# Patient Record
Sex: MALE | Race: WHITE | NOT HISPANIC OR LATINO | Employment: FULL TIME | URBAN - METROPOLITAN AREA
[De-identification: names, ages, dates, MRNs, and addresses within clinical notes are randomized per-mention and may not be internally consistent; named-entity substitution may affect disease eponyms.]

---

## 2020-05-26 ENCOUNTER — OFFICE VISIT (OUTPATIENT)
Dept: FAMILY MEDICINE | Facility: CLINIC | Age: 30
End: 2020-05-26

## 2020-05-26 VITALS
SYSTOLIC BLOOD PRESSURE: 124 MMHG | WEIGHT: 258 LBS | OXYGEN SATURATION: 97 % | HEART RATE: 91 BPM | TEMPERATURE: 97.8 F | RESPIRATION RATE: 16 BRPM | DIASTOLIC BLOOD PRESSURE: 86 MMHG

## 2020-05-26 DIAGNOSIS — R10.11 RUQ ABDOMINAL PAIN: Primary | ICD-10-CM

## 2020-05-26 DIAGNOSIS — Z87.898 HISTORY OF ALCOHOL USE: ICD-10-CM

## 2020-05-26 DIAGNOSIS — Z87.891 HISTORY OF TOBACCO USE: ICD-10-CM

## 2020-05-26 PROBLEM — F90.2 ATTENTION DEFICIT HYPERACTIVITY DISORDER (ADHD), COMBINED TYPE: Status: ACTIVE | Noted: 2020-05-26

## 2020-05-26 LAB
% GRANULOCYTES: 61 % (ref 42.2–75.2)
HCT VFR BLD AUTO: 44.1 % (ref 39–51)
HEMOGLOBIN: 15.2 G/DL (ref 13.4–17.5)
LYMPHOCYTES NFR BLD AUTO: 30.8 % (ref 20.5–51.1)
MCH RBC QN AUTO: 30.1 PG (ref 27–31)
MCHC RBC AUTO-ENTMCNC: 34.5 G/DL (ref 33–37)
MCV RBC AUTO: 87.4 FL (ref 80–100)
MONOCYTES NFR BLD AUTO: 8.2 % (ref 1.7–9.3)
PLATELET # BLD AUTO: 263 K/UL (ref 140–450)
RBC # BLD AUTO: 5.05 X10/CMM (ref 4.2–5.9)
WBC # BLD AUTO: 7.1 X10/CMM (ref 3.8–11)

## 2020-05-26 PROCEDURE — 99203 OFFICE O/P NEW LOW 30 MIN: CPT | Performed by: NURSE PRACTITIONER

## 2020-05-26 PROCEDURE — 85025 COMPLETE CBC W/AUTO DIFF WBC: CPT | Performed by: NURSE PRACTITIONER

## 2020-05-26 PROCEDURE — 93000 ELECTROCARDIOGRAM COMPLETE: CPT | Performed by: NURSE PRACTITIONER

## 2020-05-26 PROCEDURE — 36415 COLL VENOUS BLD VENIPUNCTURE: CPT | Performed by: NURSE PRACTITIONER

## 2020-05-26 SDOH — HEALTH STABILITY: PHYSICAL HEALTH: ON AVERAGE, HOW MANY MINUTES DO YOU ENGAGE IN EXERCISE AT THIS LEVEL?: 30 MIN

## 2020-05-26 SDOH — HEALTH STABILITY: PHYSICAL HEALTH: ON AVERAGE, HOW MANY DAYS PER WEEK DO YOU ENGAGE IN MODERATE TO STRENUOUS EXERCISE (LIKE A BRISK WALK)?: 2 DAYS

## 2020-05-26 NOTE — PATIENT INSTRUCTIONS
Labs and RUQ ultrasound  Great job on abstaining from alcohol and tobacco!  Focus on decreasing simple sugars and saturated fats- focus on protein, fiber, lots of colors in the diet, and monounsaturated fats (like avocados, olive oil, nuts and seeds)  Elvi will let you know results  Routine physical with fasting labs in 3-6 months

## 2020-05-26 NOTE — PROGRESS NOTES
"Problem(s) Oriented visit        SUBJECTIVE:                                                    Devante Reed is a 30 year old male who presents to clinic today for the following health issues :    Devante comes to the clinic generally feeling well, but the last week has been more bothered by RUQ pressure. He feels it started one year ago when reaching down to grab some luggage. Discomfort \"faded out\" and has been mild and intermittent since then, but has noticed more frequently in the past week. Discomfort is between 1-4/10, worse with lying down or leaning forward. Unable to identify alleviating factors. Impacting his sleep, has to lay on his left side, though pressure on the area does not bother him. Describes the pain as a \"pressure type\" discomfort, non-radiating. Has not had anything like this in the past. No fevers, chills, unintended weight loss, drenching night sweats, nausea/vomiting, diffuse abdominal pain, problems with bowels/bladder, itching, or jaundice. BMs are normal brown color, but did have raymundo colored stools with the initial RUQ pain last year. No ripping or tearing sensation, no pulsatile abdominal mass. No diaphoresis, SOB/ALMONTE, radiation to jaw or arm, weakness, fatigue with the RUQ pain. No family history of GI cancers or early ASCVD. Of note, he quit smoking three months ago. He quit drinking alcohol one month ago- had been drinking about 1L hard liquor daily for eight years- referred to this pattern as \"high functioning alcoholism.\" Had mild withdrawal symptoms at the time but is now feeling well without the desire to start drinking again. He is working on a healthy diet and has been walking twice per week as well. He states turning 30 motivated him to make changes for a healthier lifestyle.    PMH significant for ADHD for which he sparingly takes medication, hx tobacco use (quit three months ago), alcohol use (quit one month ago. PSH significant for tympanostomy tubes as a " child.      Problem list, Medication list, Allergies, and Medical/Social/Surgical histories reviewed in [facility] and updated as appropriate.   Additional history: as documented    ROS:  General, CV, resp, GI, , MSK, neuro, psych negative except as listed per HPI    Histories:   Patient Active Problem List   Diagnosis     Health Care Home     Attention deficit hyperactivity disorder (ADHD), combined type     History of tobacco use     History of alcohol use     Past Surgical History:   Procedure Laterality Date     NO HISTORY OF SURGERY       TYMPANOSTOMY, LOCAL/TOPICAL ANESTHESIA      when young       Social History     Tobacco Use     Smoking status: Former Smoker     Last attempt to quit: 10/14/2011     Years since quittin.6     Smokeless tobacco: Never Used   Substance Use Topics     Alcohol use: Yes     Family History   Problem Relation Age of Onset     Lung Cancer Paternal Grandfather         after age 50     Coronary Artery Disease Early Onset No family hx of          No current outpatient medications on file.       OBJECTIVE:                                                    Physical Exam:    /86   Pulse 91   Temp 97.8  F (36.6  C)   Resp 16   Wt 117 kg (258 lb)   SpO2 97%     CONSTITUTIONAL: Alert non-toxic appearing male in no acute distress  HEAD: Normocephalic, atraumatic  EYES: PERRLA; no scleral icterus; sclera without injection  ENT:  oropharynx pink without lesions; posterior pharynx without exudates  NECK: Neck supple without lymphadenopathy  RESPIRATORY: Lungs clear to auscultation, respirations unlabored  CV: Regular rate and rhythm, S1S2, no clicks, murmurs, rubs, or gallops; bilateral lower extremities without edema, dorsalis pedis pulses 2+ bilaterally  GASTROINTESTINAL: Normoactive bowel sounds x4 quadrants, abdomen soft, non-distended, and non-tender to palpation without masses, no splenomegaly, possible hepatomegaly on exam, negative Nair's sign, negative Robert's sign, no  rebound tenderness, guarding, or rigidity  LYMPHATIC: Cervical, supraclavicular, axillary, and inguinal nodes without lymphadenopathy  MUSCULOSKELETAL: Moves all extremities, no obvious muscle wasting  NEUROLOGIC: No gross deficits, normal gait  SKIN: Appropriate color for race, warm and dry, no rashes or lesions to unclothed skin  PSYCHIATRIC: Pleasant and interactive, affect bright, makes appropriate eye contact, thought process logical       ASSESSMENT/PLAN:                                                        Devante was seen today for other.    Diagnoses and all orders for this visit:    RUQ abdominal pain  -     EKG 12-lead complete w/read - Clinics  -     Comp. Metabolic Panel (14) ([facility])  -     CBC with Diff/Plt ([facility])  -     Referral to [facility]  -     Amylase  Serum ([facility])  -     Lipase  Serum ([facility])    EKG WNL, no s/sxs ACS. Possible hepatomegaly on exam- obtain labs and RUQ US. Discussed differential dxs of NAFLD, cholecystitis, cirrhosis, pancreatitis. Not acutely ill at the moment, reviewed s/sxs infection/emergency and when to seek emergency care. Await results of lab and US- in the meantime, continue his healthy lifestyle changes, continue to abstain from alcohol, take in healthy foods, and exercise. Return to clinic in 3-6 mos with fasting labs and physical.    History of tobacco use    Commended on cessation, reviewed QuitPlan if needed    History of alcohol use    Commended on cessation, reviewed resources for support    Patient needs assistance with ADLs: none identified today  Patient needs assistance with iADLs: none identified today    The following health maintenance items are reviewed in Epic and correct as of today:  Health Maintenance   Topic Date Due     PREVENTIVE CARE VISIT  1990     HIV SCREENING  01/06/2005     DTAP/TDAP/TD IMMUNIZATION (1 - Tdap) 01/06/2015     PHQ-2  01/01/2020     INFLUENZA VACCINE (Season Ended) 09/01/2020     IPV IMMUNIZATION  Aged Out      MENINGITIS IMMUNIZATION  Aged Out       Patient Instructions   Labs and RUQ ultrasound  Great job on abstaining from alcohol and tobacco!  Focus on decreasing simple sugars and saturated fats- focus on protein, fiber, lots of colors in the diet, and monounsaturated fats (like avocados, olive oil, nuts and seeds)  Elvi will let you know results  Routine physical with fasting labs in 3-6 months      VALENCIA Pollard CNP  [facility]  Family Practice  [facility]  [phone]    For any issues my office # is [phone]

## 2020-05-27 LAB
ALBUMIN SERPL-MCNC: 4.7 G/DL (ref 4.1–5.2)
ALBUMIN/GLOB SERPL: 2.2 {RATIO} (ref 1.2–2.2)
ALP SERPL-CCNC: 48 IU/L (ref 39–117)
ALT SERPL-CCNC: 32 IU/L (ref 0–44)
AMYLASE SERPL-CCNC: 50 U/L (ref 31–110)
AST SERPL-CCNC: 24 IU/L (ref 0–40)
BILIRUB SERPL-MCNC: 0.3 MG/DL (ref 0–1.2)
BUN SERPL-MCNC: 17 MG/DL (ref 6–20)
BUN/CREATININE RATIO: 18 (ref 9–20)
CALCIUM SERPL-MCNC: 9.3 MG/DL (ref 8.7–10.2)
CHLORIDE SERPLBLD-SCNC: 107 MMOL/L (ref 96–106)
CREAT SERPL-MCNC: 0.93 MG/DL (ref 0.76–1.27)
EGFR IF AFRICN AM: 127 ML/MIN/1.73
EGFR IF NONAFRICN AM: 110 ML/MIN/1.73
GLOBULIN, TOTAL: 2.1 G/DL (ref 1.5–4.5)
GLUCOSE SERPL-MCNC: ABNORMAL MG/DL (ref 70–99)
LIPASE SERPL-CCNC: 24 U/L (ref 13–78)
POTASSIUM SERPL-SCNC: ABNORMAL MMOL/L
PROT SERPL-MCNC: 6.8 G/DL (ref 6–8.5)
SODIUM SERPL-SCNC: 143 MMOL/L (ref 134–144)
TOTAL CO2: 22 MMOL/L (ref 20–29)

## 2020-05-28 ENCOUNTER — TRANSFERRED RECORDS (OUTPATIENT)
Dept: FAMILY MEDICINE | Facility: CLINIC | Age: 30
End: 2020-05-28

## 2020-06-01 ENCOUNTER — TELEPHONE (OUTPATIENT)
Dept: FAMILY MEDICINE | Facility: CLINIC | Age: 30
End: 2020-06-01

## 2020-06-01 NOTE — TELEPHONE ENCOUNTER
Called patient with abdominal US result.  Informed him of fatty liver disease.  Patient will be working on stopping alcohol, and losing weight.  Also will decrease his intake of simple sugars and saturated fats..  If discomfort gets worse patient was advised to call. Patient also advised to schedule a physical in 3 months with fasting labs.

## 2021-01-15 ENCOUNTER — HEALTH MAINTENANCE LETTER (OUTPATIENT)
Age: 31
End: 2021-01-15

## 2021-10-24 ENCOUNTER — HEALTH MAINTENANCE LETTER (OUTPATIENT)
Age: 31
End: 2021-10-24

## 2022-02-13 ENCOUNTER — HEALTH MAINTENANCE LETTER (OUTPATIENT)
Age: 32
End: 2022-02-13

## 2022-02-14 ENCOUNTER — OFFICE VISIT (OUTPATIENT)
Dept: FAMILY MEDICINE | Facility: CLINIC | Age: 32
End: 2022-02-14

## 2022-02-14 VITALS
HEIGHT: 71 IN | OXYGEN SATURATION: 96 % | HEART RATE: 100 BPM | BODY MASS INDEX: 36.82 KG/M2 | DIASTOLIC BLOOD PRESSURE: 86 MMHG | SYSTOLIC BLOOD PRESSURE: 130 MMHG | WEIGHT: 263 LBS

## 2022-02-14 DIAGNOSIS — R39.9 LOWER URINARY TRACT SYMPTOMS (LUTS): ICD-10-CM

## 2022-02-14 DIAGNOSIS — R31.0 GROSS HEMATURIA: Primary | ICD-10-CM

## 2022-02-14 LAB
BACTERIA URINE: 0
BILIRUB UR QL STRIP: ABNORMAL
BLOOD URINE DIP: ABNORMAL
CASTS/LPF: 0
COLOR UR: YELLOW
CRYSTAL URINE: 0
EPITHELIAL CELLS - QUEST: ABNORMAL
GLUCOSE UR STRIP-MCNC: ABNORMAL MG/DL
KETONES UR QL STRIP: ABNORMAL
LEUKOCYTE ESTERASE URINE DIP: ABNORMAL
MUCOUS URINE: 0
NITRITE UR QL STRIP: ABNORMAL
PH UR STRIP: 6 PH (ref 5–9)
PROT UR QL: ABNORMAL MG/DL (ref ?–0.01)
RBC URINE: ABNORMAL (ref 0–3)
SP GR UR STRIP: 1.01 (ref 1–1.02)
UROBILINOGEN UR QL STRIP: 0.2 EU/DL (ref 0.2–1)
WBC URINE: 0 (ref 0–3)

## 2022-02-14 PROCEDURE — 99214 OFFICE O/P EST MOD 30 MIN: CPT | Performed by: FAMILY MEDICINE

## 2022-02-14 PROCEDURE — 81003 URINALYSIS AUTO W/O SCOPE: CPT | Performed by: FAMILY MEDICINE

## 2022-02-14 ASSESSMENT — MIFFLIN-ST. JEOR: SCORE: 2165.09

## 2022-02-14 NOTE — PROGRESS NOTES
"  Barry Low is a 32 year old patient who presents to clinic for evaluation.  He was in his usual state of health until yesterday when he was urinating and developed very sharp pain and saw bright red blood in his urine.  Later in the day had less discomfort with urination but still a small amount of blood.  Today, some minimal residual symptoms and no blood.  No fever or chills.  No discharge.  Same partner for 3 years and no intercourse in 2 weeks.  No concern about STI.  Former smoker but quit over 10 years ago.  No family history of urologic malignancy.  No other concerns.          Review of Systems   Constitutional, HEENT, cardiovascular, pulmonary, GI, , musculoskeletal, neuro, skin, endocrine and psych systems are negative, except as otherwise noted.      Objective    /86   Pulse 100   Ht 1.803 m (5' 11\")   Wt 119.3 kg (263 lb)   SpO2 96%   BMI 36.68 kg/m      General: Well appearing, NAD  Psych: normal mood and affect        Results for orders placed or performed in visit on 02/14/22 (from the past 24 hour(s))   Urinalysis ([facility])   Result Value Ref Range    Color Urine Yellow     pH Urine 6.0 5 - 9 pH    Specific Gravity Urine 1.010 1.005 - 1.025    Protein Urine neg 0.01 mg/dL    Glucose Urine neg     Ketones Urine neg     Leukocyte Esterase Urine neg     Blood Urine trace (A)     Nitrite Urine Neg NEG    Bilirubin Urine Dip neg     Urobilinogen Urine 0.2 0.2 - 1.0 EU/dL    WBC Urine 0 0 - 3    RBC Urine rare 0 - 3    Epithelial Cells rare     Crystal Urine 0     Bacteria Urine 0     Mucous Urine 0     Casts/LPF 0        Gross hematuria  Improving.  Discussed CT and cysto vs observation.  Given age and improvement inclined to monitor.  Possible stone.  He prefers to avoid further testing if possible.  Recommend repeat UA in 1 month and follow up sooner if symptoms fail to completely resolve or worsen.    Lower urinary tract symptoms (LUTS)  - Urinalysis ([facility])      Follow up in 1 month " for UA

## 2022-05-12 ENCOUNTER — OFFICE VISIT (OUTPATIENT)
Dept: FAMILY MEDICINE | Facility: CLINIC | Age: 32
End: 2022-05-12

## 2022-05-12 VITALS
RESPIRATION RATE: 18 BRPM | HEART RATE: 80 BPM | WEIGHT: 261.4 LBS | OXYGEN SATURATION: 97 % | TEMPERATURE: 97.9 F | HEIGHT: 71 IN | DIASTOLIC BLOOD PRESSURE: 80 MMHG | SYSTOLIC BLOOD PRESSURE: 128 MMHG | BODY MASS INDEX: 36.6 KG/M2

## 2022-05-12 DIAGNOSIS — Z87.891 HISTORY OF TOBACCO USE: ICD-10-CM

## 2022-05-12 DIAGNOSIS — Z23 NEED FOR DIPHTHERIA-TETANUS-PERTUSSIS (TDAP) VACCINE: ICD-10-CM

## 2022-05-12 DIAGNOSIS — R31.9 HEMATURIA, UNSPECIFIED TYPE: ICD-10-CM

## 2022-05-12 DIAGNOSIS — K60.2 ANAL FISSURE: Primary | ICD-10-CM

## 2022-05-12 DIAGNOSIS — Z87.898 HISTORY OF ALCOHOL USE DISORDER: ICD-10-CM

## 2022-05-12 LAB
BACTERIA URINE: NORMAL
BILIRUB UR QL STRIP: 0
BLOOD URINE DIP: 0
CASTS/LPF: NORMAL
COLOR UR: YELLOW
CRYSTAL URINE: NORMAL
EPITHELIAL CELLS - QUEST: NORMAL
GLUCOSE UR STRIP-MCNC: 0 MG/DL
KETONES UR QL STRIP: 0
LEUKOCYTE ESTERASE URINE DIP: 0
MUCOUS URINE: NORMAL
NITRITE UR QL STRIP: NORMAL
PH UR STRIP: 5.5 PH (ref 5–9)
PROT UR QL: 0 MG/DL (ref ?–0.01)
RBC URINE: NORMAL
SP GR UR STRIP: 1.02 (ref 1–1.02)
UROBILINOGEN UR QL STRIP: 0.2 EU/DL (ref 0.2–1)
WBC URINE: NORMAL

## 2022-05-12 PROCEDURE — 99213 OFFICE O/P EST LOW 20 MIN: CPT | Mod: 25 | Performed by: FAMILY MEDICINE

## 2022-05-12 PROCEDURE — 90715 TDAP VACCINE 7 YRS/> IM: CPT | Performed by: FAMILY MEDICINE

## 2022-05-12 PROCEDURE — 81003 URINALYSIS AUTO W/O SCOPE: CPT | Performed by: FAMILY MEDICINE

## 2022-05-12 PROCEDURE — 90471 IMMUNIZATION ADMIN: CPT | Mod: 59 | Performed by: FAMILY MEDICINE

## 2022-05-12 NOTE — PATIENT INSTRUCTIONS
- Aim for 1-2 soft (like toothpaste) bowel movements per day  - Drink plenty of water (aim for 1.5 L per day)  - Continue eating a high fiber diet  - Consider adding a daily over counter fiber supplement (such as Metamucil or Citrucel). Use one tablespoon in eight ounces of liquid by mouth twice a day. Adjust as needed to maintain 1-2 soft bowel movements per day.  - If you are not able to tolerate a fiber supplement use Miralax; 17 g (capful) in 8 oz water orally, twice daily. Adjust as needed to maintain 1-2 soft bowel movements per day.

## 2022-05-12 NOTE — PROGRESS NOTES
"SUBJECTIVE:    Devante Reed, is a 32 year old male with ADHD, alcohol use disorder,  for the below:     1. 3 day Hx of blood in stools. Bright red blood coating stool with streaks on toilet paper. Associated with moderate sharp pain on passage of stool. No recent travel. No FHx of colon cancer. Stools once per day on average. Has been harder stool of late (since stopping drinking all alcohol : stopped drinking around 21 days ago). Has also quit smoking.     2. Also due 1 month recheck of gross hematuria with associated sharp urethral pain. Thought possible passage of renal stone at that time. Trace blood in urine: repeat UA due today.      OBJECTIVE:  Vitals:    05/12/22 1328   BP: 128/80   Pulse: 80   Resp: 18   Temp: 97.9  F (36.6  C)   TempSrc: Temporal   SpO2: 97%   Weight: 118.6 kg (261 lb 6.4 oz)   Height: 1.803 m (5' 11\")    Body mass index is 36.46 kg/m .    General: no acute distress, cooperative with exam.  GI: no external hemorrhoids present. No anal skin tags.    UA RESULTS:  Lab Test 05/12/22  0000   COLOR Yellow   SG 1.020   URINEPH 5.5   UROBILINOGEN 0.2   NITRITE Neg   RBCU  --    WBCU  --          ASSESSMENT / PLAN:        Anal fissure  Discussed aetiology. Discussed aiming for soft stool.   Discussed and issued on AVS:  - Aim for 1-2 soft (like toothpaste) bowel movements per day  - Drink plenty of water (aim for 1.5 L per day)  - Continue eating a high fiber diet  - Consider adding a daily over counter fiber supplement (such as Metamucil or Citrucel). Use one tablespoon in eight ounces of liquid by mouth twice a day. Adjust as needed to maintain 1-2 soft bowel movements per day.  - If you are not able to tolerate a fiber supplement use Miralax; 17 g (capful) in 8 oz water orally, twice daily. Adjust as needed to maintain 1-2 soft bowel movements per day.      Need for diphtheria-tetanus-pertussis (Tdap) vaccine  -     TDAP (ADACEL,BOOSTRIX)  -     VACCINE ADMINISTRATION, INITIAL    Hematuria, " unspecified type  Microscopic hematuria has now cleared. No further episodes of juliet hematuria. In keeping with prior passage of renal stone.   -     Urinalysis ([facility])    History of alcohol use disorder  History of tobacco use  Reports quitting alcohol and tobacco. Commended on lifestyle changes and ongoing support offered.

## 2022-05-23 ENCOUNTER — OFFICE VISIT (OUTPATIENT)
Dept: FAMILY MEDICINE | Facility: CLINIC | Age: 32
End: 2022-05-23

## 2022-05-23 VITALS
BODY MASS INDEX: 36.54 KG/M2 | OXYGEN SATURATION: 97 % | WEIGHT: 261 LBS | HEIGHT: 71 IN | SYSTOLIC BLOOD PRESSURE: 136 MMHG | TEMPERATURE: 97.5 F | HEART RATE: 98 BPM | DIASTOLIC BLOOD PRESSURE: 93 MMHG | RESPIRATION RATE: 18 BRPM

## 2022-05-23 DIAGNOSIS — M54.9 MID BACK PAIN ON LEFT SIDE: Primary | ICD-10-CM

## 2022-05-23 PROCEDURE — 99213 OFFICE O/P EST LOW 20 MIN: CPT | Performed by: NURSE PRACTITIONER

## 2022-05-23 NOTE — PROGRESS NOTES
"Problem(s) Oriented visit        SUBJECTIVE:                                                    Devante Reed is a 32 year old male who presents to clinic today for the following health issues :    Left mid back pain x 3 weeks. No trauma/injury. Mild in the morning but progressively worsens throughout the day. Rates 6/10 at its worst. Does not radiate. No vigorous exercise or strength training. Has lost 14 lbs with diet. Got new mattress, new office chair to try and help with pain. Last 2 days has taken 200-400 mg Ibuprofen which helps some.     Problem list, Medication list, Allergies, and Medical/Social/Surgical histories reviewed in [facility] and updated as appropriate.   Additional history: as documented    ROS:  5 point ROS completed and negative except noted above, including Gen, CV, Resp, MS, Neuro    OBJECTIVE:                                                    BP (!) 136/93   Pulse 98   Temp 97.5  F (36.4  C) (Temporal)   Resp 18   Ht 1.803 m (5' 11\")   Wt 118.4 kg (261 lb)   SpO2 97%   BMI 36.40 kg/m    Body mass index is 36.4 kg/m .   GENERAL: healthy, alert and no distress  RESP: lungs clear to auscultation - no rales, rhonchi or wheezes  CV: regular rates and rhythm, normal S1 S2, no S3 or S4 and no murmur, click or rub  MS: left mid back below scapula with tenderness to palpation, normal ROM  SKIN: no suspicious lesions or rashes  NEURO: Normal strength and tone, sensory exam grossly normal and mentation intact     ASSESSMENT/PLAN:                                                      Devante was seen today for musculoskeletal problem.    Diagnoses and all orders for this visit:    Mid back pain on left side    Discussed typical mechanical back pain, typical causes, and atypical back pain, including red flag symptoms.  Discussed conservative treatments including stretching and strengthening, use of heat and/or ice, NSAIDs with food. If no improvement after 2 weeks, then consider referral to physical " therapy, chiropractor, massage.    See Patient Instructions  Patient Instructions   Massage area with tennis ball while laying on back on floor 1-2 times daily. Heat applied before & ice after    Ibuprofen 600 mg 2-3 times daily for 1-2 weeks to help with inflammation.           VALENCIA Arana CNP  [facility]  Family Practice  [facility]  [phone]    For any issues my office # is [phone]

## 2022-05-23 NOTE — PATIENT INSTRUCTIONS
Massage area with tennis ball while laying on back on floor 1-2 times daily. Heat applied before & ice after    Ibuprofen 600 mg 2-3 times daily for 1-2 weeks to help with inflammation.

## 2022-10-16 ENCOUNTER — HEALTH MAINTENANCE LETTER (OUTPATIENT)
Age: 32
End: 2022-10-16

## 2023-03-26 ENCOUNTER — HEALTH MAINTENANCE LETTER (OUTPATIENT)
Age: 33
End: 2023-03-26

## 2023-04-20 ENCOUNTER — TRANSFERRED RECORDS (OUTPATIENT)
Dept: FAMILY MEDICINE | Facility: CLINIC | Age: 33
End: 2023-04-20

## 2023-06-19 ENCOUNTER — TRANSFERRED RECORDS (OUTPATIENT)
Dept: FAMILY MEDICINE | Facility: CLINIC | Age: 33
End: 2023-06-19

## 2023-06-23 ENCOUNTER — HOSPITAL ENCOUNTER (OUTPATIENT)
Dept: CT IMAGING | Facility: CLINIC | Age: 33
Discharge: HOME | End: 2023-06-23
Attending: PHYSICIAN ASSISTANT | Admitting: PHYSICIAN ASSISTANT
Payer: COMMERCIAL

## 2023-06-23 DIAGNOSIS — K76.0 HEPATIC STEATOSIS: ICD-10-CM

## 2023-06-23 DIAGNOSIS — R10.11 RIGHT UPPER QUADRANT ABDOMINAL PAIN: ICD-10-CM

## 2023-06-23 PROCEDURE — 250N000011 HC RX IP 250 OP 636

## 2023-06-23 PROCEDURE — 74177 CT ABD & PELVIS W/CONTRAST: CPT

## 2023-06-23 PROCEDURE — 250N000009 HC RX 250

## 2023-06-23 RX ORDER — IOPAMIDOL 755 MG/ML
128 INJECTION, SOLUTION INTRAVASCULAR ONCE
Status: COMPLETED | OUTPATIENT
Start: 2023-06-23 | End: 2023-06-23

## 2023-06-23 RX ADMIN — SODIUM CHLORIDE 76 ML: 9 INJECTION, SOLUTION INTRAVENOUS at 07:06

## 2023-06-23 RX ADMIN — IOPAMIDOL 128 ML: 755 INJECTION, SOLUTION INTRAVENOUS at 07:06

## 2023-06-28 ENCOUNTER — TRANSFERRED RECORDS (OUTPATIENT)
Dept: FAMILY MEDICINE | Facility: CLINIC | Age: 33
End: 2023-06-28

## 2023-08-17 ENCOUNTER — TRANSFERRED RECORDS (OUTPATIENT)
Dept: FAMILY MEDICINE | Facility: CLINIC | Age: 33
End: 2023-08-17

## 2023-09-18 PROCEDURE — 99281 EMR DPT VST MAYX REQ PHY/QHP: CPT

## 2023-09-19 ENCOUNTER — HOSPITAL ENCOUNTER (EMERGENCY)
Facility: CLINIC | Age: 33
Discharge: LEFT BEFORE DISCHARGE | End: 2023-09-19
Admitting: PHYSICIAN ASSISTANT
Payer: COMMERCIAL

## 2023-09-19 VITALS
RESPIRATION RATE: 14 BRPM | OXYGEN SATURATION: 98 % | HEIGHT: 72 IN | BODY MASS INDEX: 32.37 KG/M2 | DIASTOLIC BLOOD PRESSURE: 88 MMHG | TEMPERATURE: 98 F | HEART RATE: 97 BPM | SYSTOLIC BLOOD PRESSURE: 132 MMHG | WEIGHT: 239 LBS

## 2023-09-19 ASSESSMENT — ACTIVITIES OF DAILY LIVING (ADL)
ADLS_ACUITY_SCORE: 35
ADLS_ACUITY_SCORE: 35

## 2023-09-19 NOTE — ED TRIAGE NOTES
Ate a hard pretzel at 2345 and felt like it cut his esophagus. Also noticed he spitted some blood.       Triage Assessment       Row Name 09/19/23 0014       Triage Assessment (Adult)    Airway WDL WDL       Respiratory WDL    Respiratory WDL WDL       Skin Circulation/Temperature WDL    Skin Circulation/Temperature WDL WDL       Cardiac WDL    Cardiac WDL WDL       Peripheral/Neurovascular WDL    Peripheral Neurovascular WDL WDL       Cognitive/Neuro/Behavioral WDL    Cognitive/Neuro/Behavioral WDL WDL

## 2023-09-28 ENCOUNTER — OFFICE VISIT (OUTPATIENT)
Dept: FAMILY MEDICINE | Facility: CLINIC | Age: 33
End: 2023-09-28

## 2023-09-28 VITALS
HEART RATE: 96 BPM | WEIGHT: 241.8 LBS | SYSTOLIC BLOOD PRESSURE: 121 MMHG | DIASTOLIC BLOOD PRESSURE: 92 MMHG | BODY MASS INDEX: 32.79 KG/M2 | OXYGEN SATURATION: 99 %

## 2023-09-28 DIAGNOSIS — R53.83 FATIGUE, UNSPECIFIED TYPE: Primary | ICD-10-CM

## 2023-09-28 PROCEDURE — 99212 OFFICE O/P EST SF 10 MIN: CPT | Performed by: FAMILY MEDICINE

## 2023-09-28 NOTE — PROGRESS NOTES
Feeling much improved/.  Set up visit after UC in case   Has a PE coming up.  No worriesome findings at UC  Will follow up at PE with Dr. Brewster in October on routine issues.  KN

## 2023-10-09 ENCOUNTER — OFFICE VISIT (OUTPATIENT)
Dept: FAMILY MEDICINE | Facility: CLINIC | Age: 33
End: 2023-10-09

## 2023-10-09 VITALS
BODY MASS INDEX: 32.44 KG/M2 | WEIGHT: 239.2 LBS | OXYGEN SATURATION: 99 % | DIASTOLIC BLOOD PRESSURE: 83 MMHG | SYSTOLIC BLOOD PRESSURE: 122 MMHG | HEART RATE: 110 BPM

## 2023-10-09 DIAGNOSIS — R10.11 RUQ ABDOMINAL PAIN: Primary | ICD-10-CM

## 2023-10-09 DIAGNOSIS — K76.0 HEPATIC STEATOSIS: ICD-10-CM

## 2023-10-09 DIAGNOSIS — F10.21 HISTORY OF ALCOHOL DEPENDENCE (H): ICD-10-CM

## 2023-10-09 LAB
ALBUMIN SERPL BCG-MCNC: 5.1 G/DL (ref 3.5–5.2)
ALP SERPL-CCNC: 57 U/L (ref 40–129)
ALT SERPL W P-5'-P-CCNC: 12 U/L (ref 0–70)
AST SERPL W P-5'-P-CCNC: 12 U/L (ref 0–45)
BILIRUB DIRECT SERPL-MCNC: <0.2 MG/DL (ref 0–0.3)
BILIRUB SERPL-MCNC: 0.7 MG/DL
LIPASE SERPL-CCNC: 17 U/L (ref 13–60)
PROT SERPL-MCNC: 7.7 G/DL (ref 6.4–8.3)

## 2023-10-09 PROCEDURE — 99214 OFFICE O/P EST MOD 30 MIN: CPT | Performed by: FAMILY MEDICINE

## 2023-10-09 PROCEDURE — 83690 ASSAY OF LIPASE: CPT | Mod: ORL | Performed by: FAMILY MEDICINE

## 2023-10-09 PROCEDURE — 36415 COLL VENOUS BLD VENIPUNCTURE: CPT | Performed by: FAMILY MEDICINE

## 2023-10-09 PROCEDURE — 82040 ASSAY OF SERUM ALBUMIN: CPT | Mod: ORL | Performed by: FAMILY MEDICINE

## 2023-10-09 NOTE — PROGRESS NOTES
Answers submitted by the patient for this visit:  General Questionnaire (Submitted on 10/9/2023)  Chief Complaint: Chronic problems general questions HPI Form  What is the reason for your visit today? : Dull ache. Weird feeling upper right quadrant.  How many servings of fruits and vegetables do you eat daily?: 2-3  On average, how many sweetened beverages do you drink each day (Examples: soda, juice, sweet tea, etc.  Do NOT count diet or artificially sweetened beverages)?: 0  How many minutes a day do you exercise enough to make your heart beat faster?: 9 or less  How many days a week do you exercise enough to make your heart beat faster?: 3 or less  How many days per week do you miss taking your medication?: 0    Barry Low is a 33 year old patient who presents to clinic for follow up.    Chronic RUQ pain for years.  He stopped drinking about 2 months ago and feels much better.  He was drinking 1L of gin per day.  HR and BP much improved.  Weight down.  Seen in June and had normal CT (though the report does not comment on liver).  RUQ US in 2020 also without acute findings (hepatic steatosis).  No clear exacerbating or alleviating factors.  No chest pain or SOB.  No fever.  No rash or skin changes.  No hematuria or melena.        Review of Systems   Constitutional, HEENT, cardiovascular, pulmonary, GI, , musculoskeletal, neuro, skin, endocrine and psych systems are negative, except as otherwise noted.      Objective    /83   Pulse 110   Wt 108.5 kg (239 lb 3.2 oz)   SpO2 99%   BMI 32.44 kg/m      General: Well appearing, NAD  Abdomen: soft, nontender.  Liver enlarged 4 finger breadths by scratch test but nontender and not well palpated on exam.  Skin: no definite jaundice  Psych: normal mood and affect        No results found for this or any previous visit (from the past 24 hour(s)).    RUQ abdominal pain  Uncertain cause.  I have sent a message to Dr Hitchcock to confirm liver was normal on  recent CT.  Will obtain elastography.  If normal, consider HIDA scan and GI consult.  - US Elastography with Abdomen Limited; Future  - Hepatic function panel; Future  - Lipase; Future  - VENOUS COLLECTION  - Hepatic function panel  - Lipase    Hepatic steatosis  Recheck liver enzymes.  See above    History of alcohol dependence (H)  Congratulated on sobriety

## 2023-10-10 ENCOUNTER — ANCILLARY PROCEDURE (OUTPATIENT)
Dept: ULTRASOUND IMAGING | Facility: CLINIC | Age: 33
End: 2023-10-10
Attending: FAMILY MEDICINE
Payer: COMMERCIAL

## 2023-10-10 DIAGNOSIS — R10.11 RUQ ABDOMINAL PAIN: ICD-10-CM

## 2023-10-10 PROCEDURE — 76981 USE PARENCHYMA: CPT | Mod: GC | Performed by: RADIOLOGY

## 2023-10-18 NOTE — PROGRESS NOTES
"Male Preventative Health Visit     SUBJECTIVE:    This 33 year old male presents for a routine preventive physical exam.    The patient has the following concerns:     1. BMI 33 : following calorie deficit diet : 1700 Kcal per day for last 6 weeks or so. 14 lbs weight loss so far.   2. Hepatic steatosis with recent elastography demonstrating no significant fibrosis ; Has initial appointment with GI next week. Quit drinking 9/1/23 (drinking 1L of gin per day prior : cold turkey). Feels does not need any ongoing support for sobriety. Did not feel AA was helpful. Finds keeping busy works well. Fiancee supportive.     Patient's medications, allergies, past medical, surgical and family histories were reviewed and updated as appropriate.    Health Maintenance  Health maintenance alerts were reviewed and updated as appropriate.      OBJECTIVE:    Vitals:    10/19/23 0939   BP: 116/73   Pulse: 86   SpO2: 98%   Weight: 107.3 kg (236 lb 9.6 oz)   Height: 1.803 m (5' 11\")    Body mass index is 33 kg/m .  General: no acute distress, cooperative with exam.  HEENT:  PERRLA. Bilateral TM's, external canals, oropharynx normal.    Neck:  Supple, no lymphadenopathy or thyromegaly   Lungs: clear to auscultation bilaterally, normal respiratory effort.  Heart:  RRR without murmurs, rubs or gallops.  Normal S1 and S2.  Abdomen: normal bowel sounds, nontender, no palpable organomegaly.  Skin:  No lesions.  No rashes.  Extremities: warm, perfused, no swelling or edema.  Neuro:  CN II-XII intact, motor & sensory function all intact.    Psych: mental status normal, mood and affect appropriate.      ASSESSMENT / PLAN: This 33 year old male presents for a routine preventive physical exam. Preventive health topics discussed including adequate exercise and healthy diet. Return to clinic in one year for preventative exam or sooner with any other concerns.  Other issues discussed as noted below.      Routine general medical examination at a health " care facility  BMI 33.0-33.9,adult  Commended on lifestyle change  Discussed adding on strength training to ensure not losing muscle mass.   -     Lipid Profile ([facility])  -     VENOUS COLLECTION  -     Hemoglobin A1c; Future    Need for influenza vaccination  -     VACCINE ADMINISTRATION, INITIAL    Alcohol dependence in remission (H)  Early sobriety   Quit drinking 9/1/23 (drinking 1L of gin per day prior : cold turkey).   Feels does not need any ongoing support for sobriety. Did not feel AA was helpful. Finds keeping busy works well. Fiancee supportive.   Ongoing support offered. Low threshold for  input for sobriety support    Hepatic steatosis  Recent elastography demonstrating no significant fibrosis.  Has initial appointment with GI next week. Taking PPI for GERD symptoms with good effect.     Other orders  -     INFLUENZA,INJ,MDCK,PF,QUAD >6MO(FLUCELVAX)

## 2023-10-19 ENCOUNTER — OFFICE VISIT (OUTPATIENT)
Dept: FAMILY MEDICINE | Facility: CLINIC | Age: 33
End: 2023-10-19

## 2023-10-19 VITALS
HEIGHT: 71 IN | DIASTOLIC BLOOD PRESSURE: 73 MMHG | WEIGHT: 236.6 LBS | HEART RATE: 86 BPM | BODY MASS INDEX: 33.12 KG/M2 | SYSTOLIC BLOOD PRESSURE: 116 MMHG | OXYGEN SATURATION: 98 %

## 2023-10-19 DIAGNOSIS — Z00.00 ROUTINE GENERAL MEDICAL EXAMINATION AT A HEALTH CARE FACILITY: Primary | ICD-10-CM

## 2023-10-19 DIAGNOSIS — Z23 NEED FOR INFLUENZA VACCINATION: ICD-10-CM

## 2023-10-19 DIAGNOSIS — K76.0 HEPATIC STEATOSIS: ICD-10-CM

## 2023-10-19 DIAGNOSIS — F10.21 ALCOHOL DEPENDENCE IN REMISSION (H): ICD-10-CM

## 2023-10-19 PROBLEM — Z87.898 HISTORY OF ALCOHOL USE DISORDER: Status: RESOLVED | Noted: 2020-05-26 | Resolved: 2023-10-19

## 2023-10-19 LAB
CHOLESTEROL: 158 MG/DL (ref 100–199)
FASTING?: YES
HBA1C MFR BLD: 5.1 %
HDL (RMG): 27 MG/DL (ref 40–?)
LDL CALCULATED (RMG): 109 MG/DL (ref 0–130)
TRIGLYCERIDES (RMG): 107 MG/DL (ref 0–149)

## 2023-10-19 PROCEDURE — 83036 HEMOGLOBIN GLYCOSYLATED A1C: CPT | Mod: ORL | Performed by: FAMILY MEDICINE

## 2023-10-19 PROCEDURE — 36415 COLL VENOUS BLD VENIPUNCTURE: CPT | Performed by: FAMILY MEDICINE

## 2023-10-19 PROCEDURE — 90674 CCIIV4 VAC NO PRSV 0.5 ML IM: CPT | Performed by: FAMILY MEDICINE

## 2023-10-19 PROCEDURE — 99213 OFFICE O/P EST LOW 20 MIN: CPT | Mod: 25 | Performed by: FAMILY MEDICINE

## 2023-10-19 PROCEDURE — 80061 LIPID PANEL: CPT | Mod: QW | Performed by: FAMILY MEDICINE

## 2023-10-19 PROCEDURE — 99395 PREV VISIT EST AGE 18-39: CPT | Performed by: FAMILY MEDICINE

## 2023-10-19 PROCEDURE — 90471 IMMUNIZATION ADMIN: CPT | Mod: 59 | Performed by: FAMILY MEDICINE

## 2023-10-25 ENCOUNTER — TRANSFERRED RECORDS (OUTPATIENT)
Dept: FAMILY MEDICINE | Facility: CLINIC | Age: 33
End: 2023-10-25

## 2023-10-26 ENCOUNTER — OFFICE VISIT (OUTPATIENT)
Dept: FAMILY MEDICINE | Facility: CLINIC | Age: 33
End: 2023-10-26

## 2023-10-26 VITALS
SYSTOLIC BLOOD PRESSURE: 121 MMHG | DIASTOLIC BLOOD PRESSURE: 73 MMHG | WEIGHT: 238.8 LBS | BODY MASS INDEX: 33.31 KG/M2 | OXYGEN SATURATION: 99 % | HEART RATE: 104 BPM

## 2023-10-26 DIAGNOSIS — L81.9 PIGMENTED SKIN LESION: Primary | ICD-10-CM

## 2023-10-26 PROBLEM — K76.0 NONALCOHOLIC FATTY LIVER: Status: ACTIVE | Noted: 2023-06-19

## 2023-10-26 PROCEDURE — 11310 SHAVE SKIN LESION 0.5 CM/<: CPT | Performed by: FAMILY MEDICINE

## 2023-10-26 PROCEDURE — 88305 TISSUE EXAM BY PATHOLOGIST: CPT | Mod: TC,ORL | Performed by: FAMILY MEDICINE

## 2023-10-26 PROCEDURE — 11300 SHAVE SKIN LESION 0.5 CM/<: CPT | Performed by: FAMILY MEDICINE

## 2023-10-26 RX ORDER — OMEPRAZOLE 40 MG/1
CAPSULE, DELAYED RELEASE ORAL
COMMUNITY
Start: 2023-10-25

## 2023-10-26 NOTE — PROGRESS NOTES
Shave Excision Procedure Note    Location:     A) tan pigmented mole to left chin. approx 0.3 mm in diameter.   B) dark brown pigmented pedunculated lesion to upper back, approx 0.6 mm in diameter.   C) tan to brown pigmented raised lesion to right flank, approx 0.4 mm in diameter.     The procedure, risks and complications, which include but are not limited to bleeding, infection were discussed. Verbal consent was obtained for the procedure.     PROCEDURE:     All 3 lesion was cleansed with alcohol wipe.  0.2 mL of  2% lidocaine with epinephrine used for subcutaneous anesthesia with each. Lesions were removed using Dermablade.The patient tolerated the procedure well.      Pigmented skin lesion x 3    Follow up: The patient tolerated the procedure well without complications.  Standard post-procedure care is explained and return precautions are given.    -     Surgical Pathology Exam; Future  -     face, ears, eyelids, nose, lips, mucous membranes  -     trunk, arms, legs

## 2023-10-30 LAB
PATH REPORT.COMMENTS IMP SPEC: NORMAL
PATH REPORT.FINAL DX SPEC: NORMAL
PATH REPORT.GROSS SPEC: NORMAL
PATH REPORT.MICROSCOPIC SPEC OTHER STN: NORMAL
PATH REPORT.RELEVANT HX SPEC: NORMAL
PHOTO IMAGE: NORMAL

## 2023-10-30 PROCEDURE — 88305 TISSUE EXAM BY PATHOLOGIST: CPT | Mod: 26 | Performed by: PATHOLOGY

## 2023-11-03 ENCOUNTER — HOSPITAL ENCOUNTER (OUTPATIENT)
Dept: NUCLEAR MEDICINE | Facility: CLINIC | Age: 33
Setting detail: NUCLEAR MEDICINE
Discharge: HOME | End: 2023-11-03
Attending: FAMILY MEDICINE | Admitting: FAMILY MEDICINE
Payer: COMMERCIAL

## 2023-11-03 VITALS — WEIGHT: 234 LBS | BODY MASS INDEX: 32.64 KG/M2

## 2023-11-03 DIAGNOSIS — R10.11 RUQ ABDOMINAL PAIN: ICD-10-CM

## 2023-11-03 PROCEDURE — 343N000001 HC RX 343: Performed by: FAMILY MEDICINE

## 2023-11-03 PROCEDURE — 250N000011 HC RX IP 250 OP 636: Performed by: FAMILY MEDICINE

## 2023-11-03 PROCEDURE — 78227 HEPATOBIL SYST IMAGE W/DRUG: CPT

## 2023-11-03 PROCEDURE — A9537 TC99M MEBROFENIN: HCPCS | Performed by: FAMILY MEDICINE

## 2023-11-03 RX ORDER — KIT FOR THE PREPARATION OF TECHNETIUM TC 99M MEBROFENIN 45 MG/10ML
6 INJECTION, POWDER, LYOPHILIZED, FOR SOLUTION INTRAVENOUS ONCE
Status: COMPLETED | OUTPATIENT
Start: 2023-11-03 | End: 2023-11-03

## 2023-11-03 RX ADMIN — MEBROFENIN 6.3 MILLICURIE: 45 INJECTION, POWDER, LYOPHILIZED, FOR SOLUTION INTRAVENOUS at 07:30

## 2023-11-03 RX ADMIN — SINCALIDE 2.1 MCG: 5 INJECTION, POWDER, LYOPHILIZED, FOR SOLUTION INTRAVENOUS at 08:35

## 2023-12-06 ENCOUNTER — TRANSFERRED RECORDS (OUTPATIENT)
Dept: FAMILY MEDICINE | Facility: CLINIC | Age: 33
End: 2023-12-06

## 2024-08-14 ENCOUNTER — OFFICE VISIT (OUTPATIENT)
Dept: FAMILY MEDICINE | Facility: CLINIC | Age: 34
End: 2024-08-14

## 2024-08-14 VITALS
DIASTOLIC BLOOD PRESSURE: 81 MMHG | BODY MASS INDEX: 36.26 KG/M2 | WEIGHT: 260 LBS | HEART RATE: 94 BPM | OXYGEN SATURATION: 97 % | SYSTOLIC BLOOD PRESSURE: 120 MMHG

## 2024-08-14 DIAGNOSIS — S46.812A TRAPEZIUS MUSCLE STRAIN, LEFT, INITIAL ENCOUNTER: Primary | ICD-10-CM

## 2024-08-14 DIAGNOSIS — F10.21 ALCOHOL DEPENDENCE IN REMISSION (H): ICD-10-CM

## 2024-08-14 PROCEDURE — G2211 COMPLEX E/M VISIT ADD ON: HCPCS | Performed by: FAMILY MEDICINE

## 2024-08-14 PROCEDURE — 99213 OFFICE O/P EST LOW 20 MIN: CPT | Performed by: FAMILY MEDICINE

## 2024-08-14 RX ORDER — METHYLPHENIDATE HYDROCHLORIDE 5 MG/1
TABLET ORAL
COMMUNITY
Start: 2024-01-21

## 2024-08-14 NOTE — PROGRESS NOTES
SUBJECTIVE:    Devante Reed, is a 34 year old male presenting for the below:     1. Left sided neck pain. 3 weeks. Base of neck. Dull. Onset just after yanking motion on chainsaw. Worsens throughout day. No headaches. No upper extremities numbness, tingling, decreased power     OBJECTIVE:  Vitals:    08/14/24 0849   BP: 120/81   Pulse: 94   SpO2: 97%   Weight: 117.9 kg (260 lb)    Body mass index is 36.26 kg/m .  General: no acute distress, cooperative with exam.  Neck : no tenderness in C spine mid line. Full ROM c spine. Tender to palpation left occipital area.   Neuro: grossly intact upper extremities   Psych: mental status normal, mood and affect appropriate.      ASSESSMENT / PLAN:      Trapezius muscle strain, left, initial encounter  Tylenol, ibuprofen, heat.  Issued with stretching exercises on AVS.     Alcohol dependence in remission (H)  Stable.       The longitudinal plan of care for the diagnosis(es)/condition(s) as documented were addressed during this visit. Due to the added complexity in care, I will continue to support Devante in the subsequent management and with ongoing continuity of care.

## 2024-12-09 ENCOUNTER — OFFICE VISIT (OUTPATIENT)
Dept: FAMILY MEDICINE | Facility: CLINIC | Age: 34
End: 2024-12-09

## 2024-12-09 VITALS
TEMPERATURE: 98.5 F | DIASTOLIC BLOOD PRESSURE: 91 MMHG | OXYGEN SATURATION: 98 % | WEIGHT: 260 LBS | SYSTOLIC BLOOD PRESSURE: 117 MMHG | BODY MASS INDEX: 36.26 KG/M2 | HEART RATE: 91 BPM

## 2024-12-09 DIAGNOSIS — R05.1 ACUTE COUGH: Primary | ICD-10-CM

## 2024-12-09 PROCEDURE — 99213 OFFICE O/P EST LOW 20 MIN: CPT | Performed by: STUDENT IN AN ORGANIZED HEALTH CARE EDUCATION/TRAINING PROGRAM

## 2024-12-09 RX ORDER — BENZONATATE 200 MG/1
200 CAPSULE ORAL 3 TIMES DAILY PRN
Qty: 30 CAPSULE | Refills: 0 | Status: SHIPPED | OUTPATIENT
Start: 2024-12-09 | End: 2024-12-19

## 2024-12-09 ASSESSMENT — ENCOUNTER SYMPTOMS
COUGH: 1
FEVER: 0
SINUS PAIN: 1
VOMITING: 0
NAUSEA: 0
CHILLS: 1
HEADACHES: 1

## 2024-12-09 NOTE — ASSESSMENT & PLAN NOTE
-physical exam and symptoms consistent with viral infection  -TMs had no erythema or bulging  -pharynx and tonsils have no exudates, some cervical lymphadenopathy, but cough present  -clear to auscultation bilaterally in all lung fields  -no pain with sinus palpation  -discussed utilizing honey and tessalon perles for cough, patient to take 600mg ibuprofen q6 for headaches, can alternate with Tylenol, patient to continue guaifenesin for congestion  -if patient continues to feel symptoms at the 10 day ba or patient feels worse rather than better, can reconsider bacterial infection

## 2024-12-09 NOTE — PROGRESS NOTES
Assessment & Plan   Problem List Items Addressed This Visit       Acute cough - Primary     -physical exam and symptoms consistent with viral infection  -TMs had no erythema or bulging  -pharynx and tonsils have no exudates, some cervical lymphadenopathy, but cough present  -clear to auscultation bilaterally in all lung fields  -no pain with sinus palpation  -discussed utilizing honey and tessalon perles for cough, patient to take 600mg ibuprofen q6 for headaches, can alternate with Tylenol, patient to continue guaifenesin for congestion  -if patient continues to feel symptoms at the 10 day ba or patient feels worse rather than better, can reconsider bacterial infection         Relevant Medications    benzonatate (TESSALON) 200 MG capsule        Return if symptoms worsen or fail to improve.      Barry Low is a 34 year old, presenting for the following health issues:  URI (Severe congestion started last Wednesday / Thursday. Post nasal drip began Friday. Patient has significant amount of green colored mucus. Minor chills, temperature normal. Taking Mucinex DM and Advil.)    URI  Associated symptoms include chills, congestion, coughing and headaches. Pertinent negatives include no fever, nausea or vomiting.      Patient states that starting on Thursday, he was experiencing nasal congestion. On Friday, he began noticing post nasal drip, green/gray productive cough and painful throat. Patient has been taking Mucinex DM with minimal improvement. Patient has decreased his talking due to discomfort. Patient endorses pressure on his face and behind his right eye. Patient took Advil this morning for headache.    Review of Systems   Constitutional:  Positive for chills. Negative for fever.   HENT:  Positive for congestion and sinus pain.    Respiratory:  Positive for cough.    Gastrointestinal:  Negative for nausea and vomiting.   Neurological:  Positive for headaches.          Objective    BP (!) 117/91   Pulse  91   Temp 98.5  F (36.9  C)   Wt 117.9 kg (260 lb)   SpO2 98%   BMI 36.26 kg/m    Body mass index is 36.26 kg/m .  Physical Exam  Constitutional:       Appearance: Normal appearance.   HENT:      Head: Normocephalic and atraumatic.      Right Ear: Tympanic membrane, ear canal and external ear normal. There is no impacted cerumen.      Left Ear: Tympanic membrane, ear canal and external ear normal. There is no impacted cerumen.      Mouth/Throat:      Mouth: Mucous membranes are moist.      Pharynx: No oropharyngeal exudate or posterior oropharyngeal erythema.   Eyes:      Conjunctiva/sclera: Conjunctivae normal.      Pupils: Pupils are equal, round, and reactive to light.   Cardiovascular:      Rate and Rhythm: Normal rate and regular rhythm.      Heart sounds: Normal heart sounds. No murmur heard.  Pulmonary:      Effort: Pulmonary effort is normal. No respiratory distress.      Breath sounds: Normal breath sounds.   Lymphadenopathy:      Cervical: Cervical adenopathy present.   Neurological:      Mental Status: He is alert.   Psychiatric:         Thought Content: Thought content normal.                Signed Electronically by: Malou Shetty DO

## 2024-12-22 ENCOUNTER — HEALTH MAINTENANCE LETTER (OUTPATIENT)
Age: 34
End: 2024-12-22

## 2025-01-08 ENCOUNTER — OFFICE VISIT (OUTPATIENT)
Dept: FAMILY MEDICINE | Facility: CLINIC | Age: 35
End: 2025-01-08

## 2025-01-08 VITALS
SYSTOLIC BLOOD PRESSURE: 127 MMHG | OXYGEN SATURATION: 98 % | BODY MASS INDEX: 37.02 KG/M2 | HEIGHT: 71 IN | WEIGHT: 264.4 LBS | DIASTOLIC BLOOD PRESSURE: 85 MMHG | HEART RATE: 91 BPM

## 2025-01-08 DIAGNOSIS — S16.1XXS STRAIN OF STERNOCLEIDOMASTOID MUSCLE, SEQUELA: Primary | ICD-10-CM

## 2025-01-08 DIAGNOSIS — F10.21 ALCOHOL DEPENDENCE IN REMISSION (H): ICD-10-CM

## 2025-01-08 PROCEDURE — G2211 COMPLEX E/M VISIT ADD ON: HCPCS | Performed by: FAMILY MEDICINE

## 2025-01-08 PROCEDURE — 99213 OFFICE O/P EST LOW 20 MIN: CPT | Performed by: FAMILY MEDICINE

## 2025-01-08 NOTE — PROGRESS NOTES
"SUBJECTIVE:    Devante Reed, is a 35 year old male presenting for the below:     General Concern (Submitted on 1/7/2025)  Chief Complaint: Chronic problems general questions HPI Form  What is the reason for your visit today?: Swelling on right side of neck with ache.  When did your symptoms begin?: 3-7 days ago  What are your symptoms?: Distinct bump on neck that aches and is tender to touch.  How would you describe these symptoms?: Moderate  Are your symptoms:: Staying the same  Have you had these symptoms before?: No  Is there anything that makes you feel worse?: Touching it, certain neck positions.    Feels well otherwise with no fever or chills. Severino trauma to the area. Wonders if \"slept funny\".    OBJECTIVE:  Vitals:    01/08/25 1003   BP: 127/85   Pulse: 91   SpO2: 98%   Weight: 119.9 kg (264 lb 6.4 oz)   Height: 1.803 m (5' 11\")    Body mass index is 36.88 kg/m .  General: no acute distress, cooperative with exam.  Neck: supple, no thyroid nodules or enlargement.  Tenderness to palpation over mid section of right SCM muscle with some mild diffuse swelling in that area. No overlying redness, punctum etc. No pulsation detected.     ASSESSMENT / PLAN:      Strain of sternocleidomastoid muscle, sequela  Discussed gentle stretching, massage, tylenol, ibuprofen, topical analgesia.   Monitor for now.   If no improvement in 2 weeks,  consider ultrasound imaging to rule out alternative aetiology.      Alcohol dependence in remission (H)  Quit drinking 9/1/23 (drinking 1L of gin per day prior : cold turkey).    Follow up:  2 weeks if neck symptoms ongoing.     The longitudinal plan of care for the diagnosis(es)/condition(s) as documented were addressed during this visit. Due to the added complexity in care, I will continue to support Devante in the subsequent management and with ongoing continuity of care.    "

## 2025-01-16 ENCOUNTER — TELEPHONE (OUTPATIENT)
Dept: FAMILY MEDICINE | Facility: CLINIC | Age: 35
End: 2025-01-16

## 2025-01-16 DIAGNOSIS — S16.1XXS STRAIN OF STERNOCLEIDOMASTOID MUSCLE, SEQUELA: ICD-10-CM

## 2025-01-16 DIAGNOSIS — R22.1 LOCALIZED SWELLING, MASS OR LUMP OF NECK: Primary | ICD-10-CM

## 2025-01-16 NOTE — TELEPHONE ENCOUNTER
Patient called clinic requesting order for US of neck. He reports no improvement in swelling/bump on right side of neck. Per Dr Brewster order entered for neck US. Monique Lynn

## 2025-01-21 ENCOUNTER — ANCILLARY PROCEDURE (OUTPATIENT)
Dept: ULTRASOUND IMAGING | Facility: CLINIC | Age: 35
End: 2025-01-21
Attending: FAMILY MEDICINE
Payer: COMMERCIAL

## 2025-01-21 DIAGNOSIS — S16.1XXS STRAIN OF STERNOCLEIDOMASTOID MUSCLE, SEQUELA: ICD-10-CM

## 2025-01-21 DIAGNOSIS — R22.1 LOCALIZED SWELLING, MASS OR LUMP OF NECK: ICD-10-CM

## 2025-01-21 PROCEDURE — 76536 US EXAM OF HEAD AND NECK: CPT

## 2025-02-05 ENCOUNTER — OFFICE VISIT (OUTPATIENT)
Dept: FAMILY MEDICINE | Facility: CLINIC | Age: 35
End: 2025-02-05

## 2025-02-05 VITALS
SYSTOLIC BLOOD PRESSURE: 128 MMHG | DIASTOLIC BLOOD PRESSURE: 89 MMHG | BODY MASS INDEX: 37.41 KG/M2 | HEART RATE: 85 BPM | OXYGEN SATURATION: 98 % | WEIGHT: 268.2 LBS

## 2025-02-05 DIAGNOSIS — R22.1 LOCALIZED SWELLING, MASS OR LUMP OF NECK: Primary | ICD-10-CM

## 2025-02-05 DIAGNOSIS — F10.21 ALCOHOL DEPENDENCE, IN REMISSION (H): ICD-10-CM

## 2025-02-05 PROCEDURE — 99213 OFFICE O/P EST LOW 20 MIN: CPT | Performed by: FAMILY MEDICINE

## 2025-02-05 PROCEDURE — G2211 COMPLEX E/M VISIT ADD ON: HCPCS | Performed by: FAMILY MEDICINE

## 2025-02-05 RX ORDER — METHYLPHENIDATE HYDROCHLORIDE 10 MG/1
10 TABLET ORAL 2 TIMES DAILY
COMMUNITY
Start: 2025-01-25

## 2025-02-05 NOTE — PROGRESS NOTES
SUBJECTIVE:    Devante Reed, is a 35 year old male presenting for the below:     Persistent swelling to right neck area. Present for > 1 month. USS soft tissue:  few small normal-appearing lymph nodes are identified in the region of right neck swelling. No definitive cause for right neck swelling. Sensation of swelling pushing on throat when swallowing. Feels to be progressing.       OBJECTIVE:  Vitals:    02/05/25 1632   BP: 128/89   Pulse: 85   SpO2: 98%   Weight: 121.7 kg (268 lb 3.2 oz)    Body mass index is 37.41 kg/m .  General: no acute distress, cooperative with exam.  Neck: supple, no thyroid nodules or enlargement.  Tenderness to palpation over mid section of right SCM muscle with some mild diffuse swelling in that area. No overlying redness, punctum etc. No pulsation detected.     ASSESSMENT / PLAN:        Localized swelling, mass or lump of neck  Proceed to detailed imaging of deeper structures due to persistence   -     CT Soft Tissue Neck w Contrast; Future    Alcohol dependence, in remission (H)  Quit drinking 9/1/23 (drinking 1L of gin per day prior : cold turkey).  Has also quit e cigs. Sleeping well.       The longitudinal plan of care for the diagnosis(es)/condition(s) as documented were addressed during this visit. Due to the added complexity in care, I will continue to support Devante in the subsequent management and with ongoing continuity of care.

## 2025-02-13 ENCOUNTER — HOSPITAL ENCOUNTER (OUTPATIENT)
Dept: CT IMAGING | Facility: CLINIC | Age: 35
Discharge: HOME | End: 2025-02-13
Attending: FAMILY MEDICINE
Payer: COMMERCIAL

## 2025-02-13 DIAGNOSIS — R22.1 LOCALIZED SWELLING, MASS OR LUMP OF NECK: ICD-10-CM

## 2025-02-13 PROCEDURE — 250N000009 HC RX 250: Performed by: FAMILY MEDICINE

## 2025-02-13 PROCEDURE — 70491 CT SOFT TISSUE NECK W/DYE: CPT

## 2025-02-13 PROCEDURE — 250N000011 HC RX IP 250 OP 636: Performed by: FAMILY MEDICINE

## 2025-02-13 RX ORDER — IOPAMIDOL 755 MG/ML
90 INJECTION, SOLUTION INTRAVASCULAR ONCE
Status: COMPLETED | OUTPATIENT
Start: 2025-02-13 | End: 2025-02-13

## 2025-02-13 RX ADMIN — IOPAMIDOL 90 ML: 755 INJECTION, SOLUTION INTRAVENOUS at 08:56

## 2025-02-13 RX ADMIN — SODIUM CHLORIDE 60 ML: 9 INJECTION, SOLUTION INTRAVENOUS at 08:56

## 2025-04-16 ENCOUNTER — ALLIED HEALTH/NURSE VISIT (OUTPATIENT)
Dept: RESEARCH | Facility: CLINIC | Age: 35
End: 2025-04-16
Payer: COMMERCIAL

## 2025-04-16 VITALS
SYSTOLIC BLOOD PRESSURE: 139 MMHG | HEIGHT: 72 IN | OXYGEN SATURATION: 98 % | RESPIRATION RATE: 16 BRPM | BODY MASS INDEX: 34.54 KG/M2 | WEIGHT: 255 LBS | HEART RATE: 113 BPM | DIASTOLIC BLOOD PRESSURE: 99 MMHG

## 2025-04-16 DIAGNOSIS — Z00.6 EXAMINATION OF PARTICIPANT OR CONTROL IN CLINICAL RESEARCH: Primary | ICD-10-CM

## 2025-04-16 NOTE — PROGRESS NOTES
[place] Study Physical Exam  Study Description: The purpose of this study is to explore potential relationships between physiologic parameters collected from sensor data with physiological changes potentially induced by the administration of the COVID-19 vaccine.     Medical History Reviewed? Yes  After extensive review of the entire available medical record, to the best of my knowledge there is reason to exclude this patient from the study.     Medical Decision Making (include details from chart review, discussion with Dr. RIZO, etc): Patient with ADHD. He takes his ritalin only as he feels he needs it. Averages 3 days per week. CT performed 6/23/23 revealed that he has a left inguinal hernia. Upon further questioning, he states that it has not been repaired. Therefore, he is a screen fail.    Physical Examination  No physical exam performed, as patient did not qualify.       Vitals:    04/16/25 0922   BP: 139/99   BP Location: Right arm   Patient Position: Sitting   Cuff Size: Adult Large   Pulse: 113   Resp: 16   SpO2: 98%   Weight: 115.7 kg (255 lb)   Height: 1.829 m (6')             Immunization History   Administered Date(s) Administered    COVID-19 Monovalent 18+ (Moderna) 09/23/2021, 10/21/2021    DTAP (<7y) 04/27/1993, 11/08/1996    HIB, Unspecified 01/17/1991, 05/06/1991    Hepatitis A (Vaqta/Havrix)(Peds 12m-18y) 05/01/2008    Hepatitis B, Peds (Engerix-B/Recombivax HB) 01/14/1997, 02/24/1997, 04/02/1997, 02/12/1998    Historic Hib Hib-titer 12/13/1991, 04/15/1992, 06/02/1992, 04/27/1993    Historical DTP/aP 1990, 1990, 1990, 07/02/1991, 12/13/1991, 04/15/1992, 06/02/1992, 04/19/1994, 02/03/2003    Influenza Vaccine >6 months,quad, PF 09/01/2020    Influenza,INJ,MDCK,PF,Quad >6mo(Flucelvax) 10/19/2023    MMR (MMRII) 05/06/1991, 04/27/1993, 02/04/2002    Meningococcal (Menomune ) 05/01/2008    OPV, trivalent, live 12/13/1991, 04/15/1992, 04/27/1993, 02/10/1995, 11/08/1996    Polio,  Unspecified 1990, 1990, 07/02/1991    Rabies Immune Globulin 06/05/2014    Rabies Vaccine 06/05/2014, 06/08/2014, 06/12/2014, 06/19/2014    Rabies, Unspecified 06/05/2014    TDAP (Adacel,Boostrix) 05/01/2008, 05/12/2022    Td (Adult), Adsorbed 02/03/2003       Reminders:  Are they using prescription pain meds? No  Any first degree relatives with inflammatory bowel disease? (Crohn's, ulcerative colitis, etc) No  Any serious medical issues that require treatment and evaluation? No   Any conditions they are following closely with their PCP? No    Have you had any serious issues with previous Covid-19 immunizations? No  COVID Vaccine Screening   Have you received a dose of the Covid-19 vaccine before?   Yes, Moderna  Date of most recent Covid-19 vaccine dose:     21-October-2021   Do you currently have a health condition or are undergoing    treatment that makes you moderately to severely immunocompromised?* No  Have you ever had an allergic reaction to a Covid vaccine?**  No  Have you ever had an allergic reaction to another vaccine or injectable  medication?         No  Have you ever felt dizzy or faint before, during or after a shot?   No    *Ex: treatment of cancer, HIV, organ transplant recipient, immunosuppressive therapy, etc.     **This would include a severe allergic reaction (e.g., anaphylaxis that required treatment with epinephrine or caused you to go to the hospital. It would also include an allergic reaction that caused hives, swelling, or respiratory distress, including wheezing)    Is this subject eligible to receive a Covid-19 vaccine? Yes    Patient does not fulfill study all study inclusion and exclusion criteria. Subject will not continue in the study. This decision was made at 09:44    16-APR-2025    Shahrzad Guzman PA-C

## 2025-04-16 NOTE — PROGRESS NOTES
[place] Study Consent    Study Description: The purpose of this study is to explore potential relationships between physiologic parameters collected from sensor data with physiological changes potentially induced by the administration of the COVID-19 vaccine.    Devante Reed a 35 year old male, was on-site today at [facility] to discuss participation for [place] (-[id number])       The consent form was reviewed with the patient.     The review of the study included:  Study Purpose      Participant Duration, Responsibilities & Expectations    Study Data and Devices    Benefits and Risks of Participation    Compensation and Costs of Participation    Coded Study Data  Voluntary Participation    Study Restrictions  Confidentiality Obligations/Privacy-Related Risks   Injury, Legal, and Data Rights    Authorization to Use and Disclose Your Protected Health Information    Protocol Version: 6.0   Principle Investigator: Tim Ariza MD    Subject Number: [id number]      The subject was queried in regards to his willingness to continue and his questions were answered to his satisfaction. The patient has given his agreement to volunteer and participate in the above noted study.     The eConsent and HIPAA form version (Version 6.0 Date 03-Apr-2025) was signed on  16-APR-2025 with the Clinical Research Unit of [facility].     A copy of the [place] consent will be placed in subject's medical record. A copy of the consent form was provided to the subject today.    Study data is directly entered into [employer] and [employer] per protocol. No study procedures were done prior to Devante Reed providing informed consent.       16-APR-2025    Bev Ramirez

## 2025-04-16 NOTE — PROGRESS NOTES
[place] Inclusion/Exclusion Criteria:    Study Name: [place] (-[id number])      : Tim Ariza MD      Study Description: The purpose of this study is to explore potential relationships between physiologic parameters collected from sensor data with physiological changes potentially induced by the administration of the COVID-19 vaccine.     Protocol Version: 6.0 (Version Date: 2-APR-2025) Consent Version: 6.0 (Version Date: 3-APR-2025)  The protocol and consent form were consulted to make inclusion and exclusion decisions along with the bernice dated 27-February-2025 that addresses all [place] Phase 1.0 nuances and provides further clarification for some inclusion/exclusion criteria.     Inclusion #  Inclusion Criteria (ALL MUST BE YES)  YES/NO/N/A   1 Be at least 18 years old  Yes   2 Proficient in written and spoken English, defined by self-report   Yes   3 Willing and able to participate in the study procedures and data consent described in the consent form   Yes   4 Able to communicate effectively with and follow instructions from the Study Team    Yes   5   Eligible to receive the updated COVID-19 vaccine based on current CDC recommendations and vaccine prescribing information. (As determined by Sub-I) Yes   6   Able to disclose home address to a healthcare provider or Study Team member to enable (a) device shipping (if necessary) and (b) a 911 call in case of potential emergency (home address will not be kept as study data)  Yes   7    Able to adhere to Lifestyle Considerations (see Section 5.3) throughout study duration (as applicable). These include avoiding taking certain over-the-counter pain relievers or fever reducing medications around the time of vaccination, not taking any recreational drugs (e.g. methamphetamines, cocaine, opioids, cannabis, LSD)  within 72 hours prior to, during and after the ingestible temperature sensor monitoring period; and abstaining from strenuous  "exercise, ingestion of hot or cold liquids, eating food, chewing gum or mints, brushing teeth or smoking 30 minutes before taking oral temperature measurements.  Yes   8   Participant has their own reliable high-speed broadband internet at their home and active at the time of data collection  Yes   9   Have a personal computer, desktop, laptop, tablet, or smartphone with audiovisual capabilities Yes   If any inclusion criteria marked \"No\" please provide detail (If all Yes, N/A): N/A        Exclusion # Exclusion Criteria (ALL MUST BE NO) YES/NO/N/A   1 Participants with tattoos, skin problems or wound(s) on/in the wrist or deltoid (ex: injured or friable skin, skin disorders, or allergic skin reactions, such as eczema, rosacea, impetigo, dermatomyositis, or allergic contact dermatitis), that can interfere with study setup/assessments/vaccination  No   2 Individuals who are pregnant or plan to become pregnant during the study  No   3 Anything that may interfere with proper physiological data acquisition, such as an implantable device (e.g., cardiac pacemaker, automated implantable cardioverter-defibrillator, deep brain stimulator, Inspire upper airway stimulation device) and casts or body braces No   4 Participants that are diagnosed or are suspected to have illnesses affecting motion: e.g., Parkinson's, Essential Tremor, Dystonia, or others at investigator's discretion No   5 Participants that are diagnosed with a condition or taking a medication that impairs the immune system (i.e., active cancer, HIV/AIDS, organ/stem cell transplant recipient, autoimmune disorders, primary immunodeficiencies) No   6 Participants with any medical history, vital sign, or any other study procedure finding/assessment that in the opinion of the investigator could compromise participant safety during study participation or interfere with the study integrity and/or the accurate assessment of the study objectives No   7 Daily use of OTC or " "prescription medications with antipyretic properties at time of enrollment that is anticipated to continue during the CBT sensor data collection period surrounding administration of vaccines. Low dose aspirin (81 mg or less per day) taken for preventative purposes is permissible No   8 Individuals who are unwilling to avoid taking OTC pain relievers and anti-pyrectics for acute mild to moderate pain and fever associated with vaccine administration during the data collection days surrounding its administration No   9 Participant works for a company that develops or sells medical and/or fitness devices (e.g., ECG monitors, wearable fitness bands, sleep monitors, etc.) or are technology journalists (e.g., professional bloggers, TV, magazine, newspaper reporters, etc.) No   10 Overnight travel or travel between time zones planned during CBT sensor data collection nights No   11 Participants with planned overnight travel totaling >= 7 nights during duration of study data collection period No   12 Participant plans on moving or changing address within the study period No   13 Participant is employed in overnight shift work, or otherwise does not maintain a reasonably consistent day/night schedule (e.g., participants who are unable to regularly go to bed between 7pm to 2am and wake up between 4am to 12pm on average >= 3 times a week) No   14 Participants with clinically relevant sleep disturbances and unable to achieve at least 4 hours of continuous sleep on average each night No   If any exclusion criteria marked \"Yes\" please provide detail (If all No, N/A): N/A    Exclusion (a) # Exclusion criteria related to the COVID-19 vaccine:   (ALL MUST BE NO) YES/NO/N/A   1 Participants with a known history of a severe allergic reaction (e.g., anaphylaxis) to any component of the COVID-19 vaccine. No   2 Participants who experienced severe side effects following previous administration of the COVID-19 vaccine including " "myocarditis, pericarditis, thrombosis, or thrombocytopenia No   3 Participants in whom an additional COVID-19 vaccine is contraindicated. No   If any exclusion criteria marked \"Yes\" please provide detail (If all No, N/A): N/A    Exclusion (b) # Exclusion criteria related to Ingestible Temperature Sensor:   (ALL MUST BE NO) YES/NO/N/A   1 Participants under the age of 18 No   2 Participant weighs less than 40 kg (88 lbs.) or BMI greater than 44.6 No   3 Participants who are pregnant No   4 Participants with a known diagnosis of obstructive disease of the gastrointestinal tract or a known hernia, Crohn's disease, diverticulitis, or other inflammatory bowel disease. Yes   5 Participants with a 1st degree relative with any inflammatory bowel disease with suspected hereditary transmission (e.g., Crohn's disease, or ulcerative colitis) No   6 Participants with known history of disordered or impaired gag reflex  No   7 Participants who have problems swallowing food or pills (e.g., dysphagia) No   8 Participants with previous gastrointestinal tract surgery involving the esophagus, stomach, or intestines, excluding intraluminal endoscopy. No   9 Participants with known diagnosis of hypo-motility disorders of the gastrointestinal tract (including chronic constipation with fewer than three spontaneous bowel movements per week No   10 Participants with chronic diarrhea, as defined by 3 or more episodes of diarrhea per week for the last 30 days or >= 3 bowel movements per day No   11 Participants with known diagnosis of felinization of the esophagus (unusual folding of the esophagus)  No   12 Participants with Zenker's diverticulum (a pouch that forms in the upper part of the esophagus) and people with a history of other diverticula.  No   13 Participants who may undergo NMR or MRI scanning within one week of CBT sensor ingestion No   14 Participants with an implantable pulse generator or implantable electro-medical device of " "any kind (e.g., pacemakers (or implantable pulse generators), implantable cardioverter defibrillators (ICDs), deep brain stimulation (DBS) devices, and left ventricular assist devices (LVADs). No   15 Participants with an implanted or temporarily implanted device that uses an external power-source. No   If any exclusion criteria marked \"Yes\" please provide detail (If all No, N/A): N/A    Will the participant continue in the study? No, the participant has a small left inguinal hernia that has not been repaired.   (If \"No\", follow instructions for handling of Screen Failures)    If the participant is eligible to continue in the study, inclusion/exclusion criteria above will be sent to the PI for co-sign.    Enrollment Date: N/A      MD Bev Regalaod      "

## 2025-04-16 NOTE — PROGRESS NOTES
[place] Screening Study Note  Study Description: The purpose of this study is to explore potential relationships between physiologic parameters collected from sensor data with physiological changes potentially induced by the administration of the COVID-19 vaccine.       Subject ID:      Demographic Info  Devante Reed   1990          35 year old    SCREENING   Sex: Male    Multi Racial?: No; Primary: White   Ethnicity: Not  or      MEDICAL HISTORY REVIEW  Medical history, medications, allergies, surgical history and familial medical history were reviewed with the participant and verified by chart review.     Medical Conditions:   The table below represents their relevant medical history.   Has the subject experienced any relevant past and/or concomitant Medical History (e.g., chronic conditions such as hypertension, cardiovascular disease, stroke, diabetes, kidney disease, peripheral arterial disease, Raynaud's syndrome? Yes    Past Medical History:   Diagnosis Date    Attention Deficit Hyperactivity Disorder ; Ongoing  5/26/2020    Allergy to dog hair and epithelium ; Ongoing  6/28/2023    Small left sided inguinal hernia ; Ongoing  6/23/2023     Any History of...  If any of the below are yes, the subject is a screen fail.  -Divertic___ (diverticulosis, diverticula, diverticulitis, etc.)? No  -Rheumatoid arthritis? No  Lupus? No  Hernia? (Other than inguinal or childhood umbilical)  Yes  Peptic Ulcer? No  Crohn's Disease? No  In any 1st degree family members? No  Colitis? No  Dysphagia? No  Parkinson s? No  Essential Tremor? No      Concomitant Medications:   The table below represents their current prescription, OTC, and supplement medications they are taking on a regular basis/have taken in the last 30 days.    Medication Name (Generic) Class Start Date (MM/DD/YYYY) Ongoing? Dose Unit Frequency Route Indication   Methylphenidate  Other JAN-1-2025 Yes 10  mg PRN Oral Con Med Cond:  Attention deficit hyperactivity disorder   Multivitamin  Other OCT- Yes 1 Tablet QD Oral Other, specify Dietary Supplement       Allergies:   Does the subject have any known allergies to medications, food, a vaccine component, or latex? No  *If the participant has an allergy to something in not one of these 3 categories, include them in the medical history.*    Surgical History  Any history of gastrointestinal tract surgery involving the esophagus, stomach, or intestines? No  If there are no GI surgeries, delete surgery smartlist section.     Family Medical History  No first degree relative has a history of any inflammatory bowel disease with suspected hereditary transmission (eg. Crohn's, ulcerative colitis, etc)     Family History   Problem Relation Age of Onset    Lung Cancer Paternal Grandfather         after age 50    Coronary Artery Disease Early Onset No family hx of        Subject Characteristics     Vitals  BP (!) 139/99 (BP Location: Right arm, Patient Position: Sitting, Cuff Size: Adult Large)   Pulse 113   Resp 16   Ht 1.829 m (6')   Wt 115.7 kg (255 lb)   SpO2 98%   BMI 34.58 kg/m         Gupta Scale:  Wrist Circumference: Study Watch Wrist Preferred Watch Wrist Dominant Hand Watch Band Tightness:   2 19.4 cm Left Left Right Secure      Watch Size Preference: 45mm    ENROLLMENT    Was the visit performed? No   Did the subject complete the study?: No  Which visit did the subject exit from the study? Screening Visit  Reason for early discontinuation of study: Screen Failure     16-APR-2025   Bev Ramirez

## 2025-07-22 ENCOUNTER — OFFICE VISIT (OUTPATIENT)
Dept: FAMILY MEDICINE | Facility: CLINIC | Age: 35
End: 2025-07-22

## 2025-07-22 VITALS
DIASTOLIC BLOOD PRESSURE: 83 MMHG | BODY MASS INDEX: 33.26 KG/M2 | SYSTOLIC BLOOD PRESSURE: 137 MMHG | HEART RATE: 90 BPM | WEIGHT: 245.2 LBS | OXYGEN SATURATION: 99 %

## 2025-07-22 DIAGNOSIS — E66.811 CLASS 1 OBESITY WITHOUT SERIOUS COMORBIDITY WITH BODY MASS INDEX (BMI) OF 33.0 TO 33.9 IN ADULT, UNSPECIFIED OBESITY TYPE: Primary | ICD-10-CM

## 2025-07-22 PROCEDURE — 99213 OFFICE O/P EST LOW 20 MIN: CPT | Performed by: FAMILY MEDICINE

## 2025-07-22 PROCEDURE — 3079F DIAST BP 80-89 MM HG: CPT | Performed by: FAMILY MEDICINE

## 2025-07-22 PROCEDURE — G2211 COMPLEX E/M VISIT ADD ON: HCPCS | Performed by: FAMILY MEDICINE

## 2025-07-22 PROCEDURE — 3075F SYST BP GE 130 - 139MM HG: CPT | Performed by: FAMILY MEDICINE

## 2025-07-22 RX ORDER — METHYLPHENIDATE HYDROCHLORIDE 36 MG/1
36 TABLET ORAL EVERY MORNING
COMMUNITY
Start: 2025-06-18

## 2025-07-22 NOTE — PROGRESS NOTES
SUBJECTIVE:    Devante Reed, is a 35 year old male presenting for the below:     Recent weight loss (-32 lbs); pt is noticing occasional high HR while exercising (~162 bpm). Wanting to increase exercise while making sure his HR is within same limits.     Has significantly increase nutritional value and quality of food. Has cut out alcohol.     While playing kick ball ; HR reached 165 BPM. Admits to feeling anxious at the time when noted elevated HR. Plans to start pickle ball.     OBJECTIVE:  Vitals:    07/22/25 0940   BP: 137/83   Pulse: 90   SpO2: 99%   Weight: 111.2 kg (245 lb 3.2 oz)    Body mass index is 33.26 kg/m .    General: no acute distress, cooperative with exam.  Lungs: clear to auscultation bilaterally, normal respiratory effort.  Heart: regular rate, normal S1 and S2, no murmurs.   Abdomen: normal bowel sounds, nontender, no palpable organomegaly.    ASSESSMENT / PLAN:      Class 1 obesity without serious comorbidity with body mass index (BMI) of 33.0 to 33.9 in adult, unspecified obesity type  BMI 33.0-33.9,adult  Commended on weight loss thus far  Discussed max hr guideline of 220 - your age. = 185 : recent exertional HR was below this : given reassurance.   Discussed aiming for mix of CV and strength training.     Follow up:  Appointments in Next Year      Overdue annual physical.